# Patient Record
Sex: FEMALE | Race: WHITE | Employment: OTHER | URBAN - METROPOLITAN AREA
[De-identification: names, ages, dates, MRNs, and addresses within clinical notes are randomized per-mention and may not be internally consistent; named-entity substitution may affect disease eponyms.]

---

## 2018-01-15 RX ORDER — LEVOTHYROXINE SODIUM 0.07 MG/1
75 TABLET ORAL DAILY
COMMUNITY

## 2018-02-16 ENCOUNTER — HOSPITAL ENCOUNTER (OUTPATIENT)
Dept: ULTRASOUND IMAGING | Age: 83
End: 2018-02-16
Payer: MEDICARE

## 2018-02-16 ENCOUNTER — HOSPITAL ENCOUNTER (OUTPATIENT)
Dept: WOMENS IMAGING | Age: 83
Discharge: HOME | End: 2018-02-18
Payer: MEDICARE

## 2018-02-16 DIAGNOSIS — C50.912 MALIGNANT NEOPLASM OF LEFT FEMALE BREAST, UNSPECIFIED ESTROGEN RECEPTOR STATUS, UNSPECIFIED SITE OF BREAST (HCC): ICD-10-CM

## 2018-02-16 PROCEDURE — 77065 DX MAMMO INCL CAD UNI: CPT

## 2018-10-22 PROBLEM — Z85.3 PERSONAL HISTORY OF MALIGNANT NEOPLASM OF BREAST: Status: ACTIVE | Noted: 2018-10-22

## 2018-10-22 PROBLEM — Z17.0 ESTROGEN RECEPTOR POSITIVE: Status: ACTIVE | Noted: 2018-10-22

## 2018-10-22 PROBLEM — C50.819: Status: ACTIVE | Noted: 2018-10-22

## 2018-10-22 PROBLEM — Z90.12 ACQUIRED ABSENCE OF LEFT BREAST AND NIPPLE: Status: ACTIVE | Noted: 2018-10-22

## 2019-01-08 DIAGNOSIS — C44.90: ICD-10-CM

## 2019-01-08 DIAGNOSIS — C50.812 MALIGNANT NEOPLASM OF OVERLAPPING SITES OF LEFT BREAST IN FEMALE, ESTROGEN RECEPTOR POSITIVE (HCC): ICD-10-CM

## 2019-01-08 DIAGNOSIS — Z85.828 HISTORY OF BASAL CELL CARCINOMA: ICD-10-CM

## 2019-01-08 DIAGNOSIS — Z17.0 MALIGNANT NEOPLASM OF OVERLAPPING SITES OF LEFT BREAST IN FEMALE, ESTROGEN RECEPTOR POSITIVE (HCC): ICD-10-CM

## 2019-01-08 LAB
ALBUMIN SERPL-MCNC: 3.9 G/DL (ref 3.9–4.9)
ALP BLD-CCNC: 85 U/L (ref 40–130)
ALT SERPL-CCNC: 7 U/L (ref 0–33)
ANION GAP SERPL CALCULATED.3IONS-SCNC: 17 MEQ/L (ref 7–13)
AST SERPL-CCNC: 18 U/L (ref 0–35)
BILIRUB SERPL-MCNC: 0.5 MG/DL (ref 0–1.2)
BUN BLDV-MCNC: 29 MG/DL (ref 8–23)
CALCIUM SERPL-MCNC: 9.7 MG/DL (ref 8.6–10.2)
CHLORIDE BLD-SCNC: 104 MEQ/L (ref 98–107)
CO2: 22 MEQ/L (ref 22–29)
CREAT SERPL-MCNC: 1.03 MG/DL (ref 0.5–0.9)
GFR AFRICAN AMERICAN: >60
GFR NON-AFRICAN AMERICAN: 50.4
GLOBULIN: 3.1 G/DL (ref 2.3–3.5)
GLUCOSE BLD-MCNC: 98 MG/DL (ref 74–109)
POTASSIUM SERPL-SCNC: 4.4 MEQ/L (ref 3.5–5.1)
SODIUM BLD-SCNC: 143 MEQ/L (ref 132–144)
TOTAL PROTEIN: 7 G/DL (ref 6.4–8.1)

## 2019-01-10 LAB — CA 27-29: 22 U/ML (ref 0–38)

## 2019-03-05 ENCOUNTER — OFFICE VISIT (OUTPATIENT)
Dept: GERIATRIC MEDICINE | Age: 84
End: 2019-03-05
Payer: MEDICARE

## 2019-03-05 DIAGNOSIS — R53.1 WEAKNESS: ICD-10-CM

## 2019-03-05 DIAGNOSIS — M81.0 OSTEOPOROSIS WITHOUT CURRENT PATHOLOGICAL FRACTURE, UNSPECIFIED OSTEOPOROSIS TYPE: ICD-10-CM

## 2019-03-05 DIAGNOSIS — E03.9 HYPOTHYROIDISM, UNSPECIFIED TYPE: Primary | ICD-10-CM

## 2019-03-05 DIAGNOSIS — M15.9 OSTEOARTHRITIS OF MULTIPLE JOINTS, UNSPECIFIED OSTEOARTHRITIS TYPE: ICD-10-CM

## 2019-03-05 PROCEDURE — 1123F ACP DISCUSS/DSCN MKR DOCD: CPT | Performed by: INTERNAL MEDICINE

## 2019-03-05 PROCEDURE — 1101F PT FALLS ASSESS-DOCD LE1/YR: CPT | Performed by: INTERNAL MEDICINE

## 2019-03-05 PROCEDURE — G8484 FLU IMMUNIZE NO ADMIN: HCPCS | Performed by: INTERNAL MEDICINE

## 2019-03-05 PROCEDURE — 99305 1ST NF CARE MODERATE MDM 35: CPT | Performed by: INTERNAL MEDICINE

## 2019-03-19 VITALS
HEART RATE: 76 BPM | TEMPERATURE: 97 F | WEIGHT: 142 LBS | DIASTOLIC BLOOD PRESSURE: 76 MMHG | SYSTOLIC BLOOD PRESSURE: 132 MMHG

## 2019-06-18 ENCOUNTER — HOSPITAL ENCOUNTER (OUTPATIENT)
Dept: ULTRASOUND IMAGING | Age: 84
Discharge: HOME | End: 2019-06-20
Payer: MEDICARE

## 2019-06-18 DIAGNOSIS — I82.4Y9 DEEP VEIN THROMBOSIS (DVT) OF PROXIMAL LOWER EXTREMITY, UNSPECIFIED CHRONICITY, UNSPECIFIED LATERALITY (HCC): ICD-10-CM

## 2019-06-18 DIAGNOSIS — M79.605 LEFT LEG PAIN: ICD-10-CM

## 2019-06-18 PROCEDURE — 93971 EXTREMITY STUDY: CPT

## 2019-07-16 ENCOUNTER — OFFICE VISIT (OUTPATIENT)
Dept: GERIATRIC MEDICINE | Age: 84
End: 2019-07-16
Payer: MEDICARE

## 2019-07-16 DIAGNOSIS — M15.9 OSTEOARTHRITIS OF MULTIPLE JOINTS, UNSPECIFIED OSTEOARTHRITIS TYPE: ICD-10-CM

## 2019-07-16 DIAGNOSIS — R53.1 WEAKNESS: ICD-10-CM

## 2019-07-16 DIAGNOSIS — K59.00 CONSTIPATION, UNSPECIFIED CONSTIPATION TYPE: ICD-10-CM

## 2019-07-16 DIAGNOSIS — R60.0 BILATERAL LEG EDEMA: Primary | ICD-10-CM

## 2019-07-16 DIAGNOSIS — I10 HYPERTENSION, UNSPECIFIED TYPE: ICD-10-CM

## 2019-07-16 PROCEDURE — G8421 BMI NOT CALCULATED: HCPCS | Performed by: INTERNAL MEDICINE

## 2019-07-16 PROCEDURE — 1123F ACP DISCUSS/DSCN MKR DOCD: CPT | Performed by: INTERNAL MEDICINE

## 2019-07-16 PROCEDURE — 1090F PRES/ABSN URINE INCON ASSESS: CPT | Performed by: INTERNAL MEDICINE

## 2019-07-19 LAB
ANION GAP SERPL CALCULATED.3IONS-SCNC: 17 MEQ/L (ref 9–15)
BUN BLDV-MCNC: 20 MG/DL (ref 8–23)
CALCIUM SERPL-MCNC: 9.6 MG/DL (ref 8.5–9.9)
CHLORIDE BLD-SCNC: 102 MEQ/L (ref 95–107)
CO2: 25 MEQ/L (ref 20–31)
CREAT SERPL-MCNC: 0.96 MG/DL (ref 0.5–0.9)
GFR AFRICAN AMERICAN: >60
GFR NON-AFRICAN AMERICAN: 54.6
GLUCOSE BLD-MCNC: 77 MG/DL (ref 70–99)
POTASSIUM SERPL-SCNC: 3 MEQ/L (ref 3.4–4.9)
SODIUM BLD-SCNC: 144 MEQ/L (ref 135–144)

## 2019-08-01 LAB
APTT: 30.4 SEC (ref 24.4–36.8)
INR BLD: 1.1
PROTHROMBIN TIME: 14.2 SEC (ref 12.3–14.9)

## 2019-08-02 ENCOUNTER — HOSPITAL ENCOUNTER (OUTPATIENT)
Dept: GENERAL RADIOLOGY | Age: 84
Discharge: HOME | End: 2019-08-04
Payer: MEDICARE

## 2019-08-02 ENCOUNTER — HOSPITAL ENCOUNTER (OUTPATIENT)
Dept: ULTRASOUND IMAGING | Age: 84
Discharge: HOME | End: 2019-08-04
Payer: MEDICARE

## 2019-08-02 VITALS
TEMPERATURE: 98.6 F | HEART RATE: 83 BPM | DIASTOLIC BLOOD PRESSURE: 76 MMHG | RESPIRATION RATE: 12 BRPM | SYSTOLIC BLOOD PRESSURE: 131 MMHG | OXYGEN SATURATION: 98 %

## 2019-08-02 DIAGNOSIS — J90 PLEURAL EFFUSION: ICD-10-CM

## 2019-08-02 LAB
ALBUMIN FLUID: 1.1 G/DL
APPEARANCE FLUID: NORMAL
FLUID TYPE: NORMAL
GRAM STAIN RESULT: NORMAL
LD, FLUID: 100 U/L
PROTEIN FLUID: 1.8 G/DL

## 2019-08-02 PROCEDURE — 88112 CYTOPATH CELL ENHANCE TECH: CPT

## 2019-08-02 PROCEDURE — 89051 BODY FLUID CELL COUNT: CPT

## 2019-08-02 PROCEDURE — 2500000003 HC RX 250 WO HCPCS: Performed by: RADIOLOGY

## 2019-08-02 PROCEDURE — 71045 X-RAY EXAM CHEST 1 VIEW: CPT

## 2019-08-02 PROCEDURE — 84157 ASSAY OF PROTEIN OTHER: CPT

## 2019-08-02 PROCEDURE — 83615 LACTATE (LD) (LDH) ENZYME: CPT

## 2019-08-02 PROCEDURE — C1729 CATH, DRAINAGE: HCPCS

## 2019-08-02 PROCEDURE — 88305 TISSUE EXAM BY PATHOLOGIST: CPT

## 2019-08-02 PROCEDURE — 82042 OTHER SOURCE ALBUMIN QUAN EA: CPT

## 2019-08-02 PROCEDURE — 87205 SMEAR GRAM STAIN: CPT

## 2019-08-02 PROCEDURE — 87070 CULTURE OTHR SPECIMN AEROBIC: CPT

## 2019-08-02 RX ORDER — SODIUM CHLORIDE 0.9 % (FLUSH) 0.9 %
10 SYRINGE (ML) INJECTION PRN
Status: DISCONTINUED | OUTPATIENT
Start: 2019-08-02 | End: 2019-08-05 | Stop reason: HOSPADM

## 2019-08-02 RX ORDER — SODIUM CHLORIDE 0.9 % (FLUSH) 0.9 %
10 SYRINGE (ML) INJECTION EVERY 12 HOURS SCHEDULED
Status: DISCONTINUED | OUTPATIENT
Start: 2019-08-02 | End: 2019-08-05 | Stop reason: HOSPADM

## 2019-08-02 RX ORDER — LIDOCAINE HYDROCHLORIDE 20 MG/ML
INJECTION, SOLUTION INFILTRATION; PERINEURAL
Status: COMPLETED | OUTPATIENT
Start: 2019-08-02 | End: 2019-08-02

## 2019-08-02 RX ADMIN — LIDOCAINE HYDROCHLORIDE 3 ML: 20 INJECTION, SOLUTION INFILTRATION; PERINEURAL at 10:09

## 2019-08-02 ASSESSMENT — PAIN - FUNCTIONAL ASSESSMENT: PAIN_FUNCTIONAL_ASSESSMENT: 0-10

## 2019-08-02 NOTE — PROGRESS NOTES
Arrived in CT holding after post procedure chest xray. Pt denies any chest discomfort or SOB. Reviewed the discharge instructions and pt verbalizes understanding. Dr Jose Scott called to state  that chest xray shows no pneumothorax but a very small area of effusion still remains. Report called to Kayleigh Lopez at [address] [place] [address] regarding the patient's status after thoracentesis. Assisted the patient to dress and to sit in wheelchair. At 11:10 Am she was taken by wheelchair to the [facility] where the [facility] transport was waiting.

## 2019-08-03 LAB
APPEARANCE FLUID: NORMAL
CELL COUNT FLUID TYPE: NORMAL
CLOT EVALUATION: NORMAL
COLOR FLUID: YELLOW
FLUID PATH CONSULT: YES
LYMPHOCYTES, BODY FLUID: 55 %
MONOCYTE, FLUID: 28 %
NEUTROPHIL, FLUID: 17 %
NUCLEATED CELLS FLUID: 114 /CUMM
NUMBER OF CELLS COUNTED FLUID: 100
RBC FLUID: 1088 /CUMM

## 2019-08-03 NOTE — PROGRESS NOTES
Spoke to Nurse Manager Cierra at [employer]. She states Ms. Radha Noriega is doing well and denies any chest discomfort or SOB.

## 2019-08-05 LAB
BODY FLUID CULTURE, STERILE: NORMAL
PATH CONSULT FLUID: NORMAL

## 2019-08-06 ENCOUNTER — OFFICE VISIT (OUTPATIENT)
Dept: GERIATRIC MEDICINE | Age: 84
End: 2019-08-06
Payer: MEDICARE

## 2019-08-06 DIAGNOSIS — J90 PLEURAL EFFUSION: Primary | ICD-10-CM

## 2019-08-06 PROCEDURE — G8421 BMI NOT CALCULATED: HCPCS | Performed by: INTERNAL MEDICINE

## 2019-08-06 PROCEDURE — 1123F ACP DISCUSS/DSCN MKR DOCD: CPT | Performed by: INTERNAL MEDICINE

## 2019-08-06 PROCEDURE — 1090F PRES/ABSN URINE INCON ASSESS: CPT | Performed by: INTERNAL MEDICINE

## 2019-08-16 ENCOUNTER — HOSPITAL ENCOUNTER (OUTPATIENT)
Dept: CT IMAGING | Age: 84
Discharge: HOME | End: 2019-08-18
Payer: MEDICARE

## 2019-08-16 DIAGNOSIS — J90 PLEURAL EFFUSION: ICD-10-CM

## 2019-08-16 DIAGNOSIS — Z98.890 S/P THORACENTESIS: ICD-10-CM

## 2019-08-16 DIAGNOSIS — R05.9 COUGH: ICD-10-CM

## 2019-08-16 PROCEDURE — 71250 CT THORAX DX C-: CPT

## 2019-08-26 VITALS
DIASTOLIC BLOOD PRESSURE: 80 MMHG | SYSTOLIC BLOOD PRESSURE: 130 MMHG | WEIGHT: 149 LBS | TEMPERATURE: 97.2 F | HEART RATE: 97 BPM

## 2019-08-26 NOTE — PROGRESS NOTES
are small, minimally reactive. Oral mucosa is moist.  Chest showed diminished breath sounds. No crackles, no wheezing. Cardiovascular exam showed a regular rate. Abdomen was soft, nontender. Extremities showed +1 dorsal pedal pulse, +1 edema. ASSESSMENT AND PLAN:  1. Edema. We will increase the patient's diuretic at this time. We will check BMP as well on Friday. Monitoring weights closely. Monitoring electrolytes. 2.   Hypertension. Blood pressure is stable. No orthostasis. 3.   Weakness. The patient will benefit from course of therapy with a goal of maximization of her functional status. 4.   Degenerative joint disease. No new pain crisis. 5.   Constipation. No new impactions. We will monitor. Please note, the patient is clinically stable at this time. Lasix increased to 40 mg daily. BMP ordered at this time.         Electronically Signed By: Dinorah Lorenzo M.D. on 07/17/2019 09:40:28  ______________________________  Dinorah Lorenzo M.D.  [id number]  D: 07/16/2019 18:18:26  T: 07/17/2019 00:43:30    cc: Mecca cornejo Beyer

## 2019-09-07 NOTE — PROGRESS NOTES
PATIENTJodeen Day : 1929 DOS: 2019     Arturo Buckner    Seen for acute visit for shortness of breath. SUBJECTIVE:  This 70-year-old woman was recently sent out after she we had identified large pleural effusion. The patient did require thoracentesis via interventional radiology. The patient was stabilized, did tolerate her procedure well. Postoperatively, the patient has returned here. No evidence of acute clinical pneumothorax. The patient's respiratory status has much improved. She did have moderate left pleural effusion occupying at least 1/3rd of the left hemithorax. Prior 2 follow up chest x-ray is pending at this time. The patient has not had any new emesis, fevers or chills. The patient is globally weak. She still is coughing intermittently, has expiratory wheezing, otherwise she is clinically stable. OBJECTIVE:  She appeared frail. Pupils show poor visual acuity. Left eye is somewhat dilated. Oral mucosa is dry. Chest showed coarse breath sounds. Cardiovascular exam showed a regular rate. Abdomen was soft, nontender. Extremities showed trace dorsal pedal pulse. ASSESSMENT AND PLAN:  Pleural effusion with shortness of breath: The patient has undergone thoracentesis, clinically stable at this time. May need follow up x-ray to reevaluate her status. Continue respiratory treatments. We will monitor clinically.          Electronically Signed By: Ramona Lopez M.D. on 2019 11:21:58  ______________________________  Ramona Lopez M.D.  [facility]  D: 2019 17:37:47  T: 2019 07:42:41    cc: Lanie Beckman at [place]

## 2019-11-13 LAB — TSH SERPL DL<=0.05 MIU/L-ACNC: 2.79 UIU/ML (ref 0.44–3.86)

## 2020-01-07 ENCOUNTER — OFFICE VISIT (OUTPATIENT)
Dept: GERIATRIC MEDICINE | Age: 85
End: 2020-01-07
Payer: MEDICARE

## 2020-01-07 PROCEDURE — 1090F PRES/ABSN URINE INCON ASSESS: CPT | Performed by: INTERNAL MEDICINE

## 2020-01-07 PROCEDURE — G8421 BMI NOT CALCULATED: HCPCS | Performed by: INTERNAL MEDICINE

## 2020-01-07 PROCEDURE — G8484 FLU IMMUNIZE NO ADMIN: HCPCS | Performed by: INTERNAL MEDICINE

## 2020-01-07 PROCEDURE — 1123F ACP DISCUSS/DSCN MKR DOCD: CPT | Performed by: INTERNAL MEDICINE

## 2020-01-10 NOTE — PROGRESS NOTES
PATIENTDorene Smoker : 1929 DOS: 2020     Tom La    Seen for routine monthly visit for her degenerative joint disease, weight loss, COPD, hypothyroidism. MEDICATIONS:  Reviewed. SUBJECTIVE:  This 63-year-old woman was evaluated in her room. The patient has been diuresed intermittently, which may be the cause of her ongoing weight loss. The patient has no new emesis, fevers or chills. No change in bowel or bladder habits. The patient is intermittently confused, with no new acute pain crisis. REVIEW OF SYSTEMS:  Denied chest pain, palpitations, nausea, vomiting. OBJECTIVE:  She appeared clinically stable. Pupils are small, they are reactive. Oral mucosa is moist.  Chest showed no crackles, no wheezing. Cardiovascular exam showed a regular rate. Abdomen was soft, nontender. Extremities showed trace dorsal pedal pulse, trace pedal edema. ASSESSMENT AND PLAN:  1. Weight loss. We will monitor clinically. Can consider repeating prealbumin in the next 3 months. 2.   Hypertension. Blood pressure is stable. No orthostasis. 3.   Degenerative joint disease. No evidence of new acute pain crisis. We will monitor.         Electronically Signed By: Ese Akers M.D. on 01/10/2020 03:39:53  ______________________________  Ese Akers M.D. [id number]  D: 2020 21:01:44  T: 2020 14:26:34    cc: Panda Calreo at [facility]

## 2020-04-07 ENCOUNTER — OFFICE VISIT (OUTPATIENT)
Dept: GERIATRIC MEDICINE | Age: 85
End: 2020-04-07
Payer: MEDICARE

## 2020-04-07 PROCEDURE — G8421 BMI NOT CALCULATED: HCPCS | Performed by: INTERNAL MEDICINE

## 2020-04-07 PROCEDURE — 1123F ACP DISCUSS/DSCN MKR DOCD: CPT | Performed by: INTERNAL MEDICINE

## 2020-04-07 PROCEDURE — 1090F PRES/ABSN URINE INCON ASSESS: CPT | Performed by: INTERNAL MEDICINE

## 2020-05-19 ENCOUNTER — OFFICE VISIT (OUTPATIENT)
Dept: GERIATRIC MEDICINE | Age: 85
End: 2020-05-19
Payer: MEDICARE

## 2020-05-19 PROCEDURE — 1090F PRES/ABSN URINE INCON ASSESS: CPT | Performed by: INTERNAL MEDICINE

## 2020-05-19 PROCEDURE — 1123F ACP DISCUSS/DSCN MKR DOCD: CPT | Performed by: INTERNAL MEDICINE

## 2020-05-19 PROCEDURE — G8421 BMI NOT CALCULATED: HCPCS | Performed by: INTERNAL MEDICINE

## 2020-06-16 ENCOUNTER — OFFICE VISIT (OUTPATIENT)
Dept: GERIATRIC MEDICINE | Age: 85
End: 2020-06-16
Payer: MEDICARE

## 2020-06-16 PROCEDURE — 1123F ACP DISCUSS/DSCN MKR DOCD: CPT | Performed by: INTERNAL MEDICINE

## 2020-06-16 PROCEDURE — G8421 BMI NOT CALCULATED: HCPCS | Performed by: INTERNAL MEDICINE

## 2020-06-16 PROCEDURE — 1090F PRES/ABSN URINE INCON ASSESS: CPT | Performed by: INTERNAL MEDICINE

## 2020-06-18 PROBLEM — E03.9 HYPOTHYROIDISM: Status: ACTIVE | Noted: 2020-06-18

## 2020-06-18 PROBLEM — F02.80 ALZHEIMER'S DEMENTIA WITHOUT BEHAVIORAL DISTURBANCE (HCC): Status: ACTIVE | Noted: 2020-06-18

## 2020-06-18 PROBLEM — G30.9 ALZHEIMER'S DEMENTIA WITHOUT BEHAVIORAL DISTURBANCE (HCC): Status: ACTIVE | Noted: 2020-06-18

## 2020-06-18 PROBLEM — I10 ESSENTIAL HYPERTENSION: Status: ACTIVE | Noted: 2020-06-18

## 2020-06-24 NOTE — PROGRESS NOTES
PATIENTHiltonofre Luque : 1929 DOS: 2020     Karen Schmid    Seen for routine monthly visit for her degenerative joint disease, hypertension, cognitive impairment. This is a very pleasant woman, who was evaluated in her room. She is at her baseline, seated up. Has had ongoing intermittent edema in lower extremities, was recently seen by nurse practitioner for lower extremity rash, has been diuresed at times. The patient has not suffered any new chest pain or palpitation. No change in her bowel or bladder habits. REVIEW OF SYSTEMS:  Denied headache, fevers, chills. OBJECTIVE:  She appeared chronically ill. Pupils are small, they are reactive. Oral mucosa is moist.  Chest showed no crackles, no wheezing. Cardiovascular exam showed a regular rate. Abdomen was soft, nontender. Extremities showed trace dorsal pedal pulse. ASSESSMENT AND PLAN:  1. Degenerative joint disease:  No new pain crisis. 2.   Hypertension:  Blood pressure is stable. 3.   Cognitive impairment:  No evidence of acute psychosis. 4.   Edema:  No acute cellulitis. We will diurese as needed.          Electronically Signed By: Jaime Almonte M.D. on 2020 92:59:69  ______________________________  SMITHA Willett  D: 2020 16:39:27  T: 2020 01:15:16    cc: Ida Mays at [place]

## 2020-07-21 ENCOUNTER — OFFICE VISIT (OUTPATIENT)
Dept: GERIATRIC MEDICINE | Age: 85
End: 2020-07-21
Payer: MEDICARE

## 2020-07-21 PROCEDURE — 1090F PRES/ABSN URINE INCON ASSESS: CPT | Performed by: INTERNAL MEDICINE

## 2020-07-21 PROCEDURE — G8421 BMI NOT CALCULATED: HCPCS | Performed by: INTERNAL MEDICINE

## 2020-07-21 PROCEDURE — 1123F ACP DISCUSS/DSCN MKR DOCD: CPT | Performed by: INTERNAL MEDICINE

## 2020-08-20 NOTE — PROGRESS NOTES
associated with ocular trauma    History of basal cell carcinoma    H/O syncope    History of surgical procedure    Calcium blood increased    HPTH (hyperparathyroidism) ([address]    Left ventricular hypertrophy    Meibomian gland dysfunction (MGD)    Open angle with borderline findings, low risk, unspecified eye    Osteoporosis    Primary cancer of skin of unknown cell type    Basal cell papilloma    Pseudophakia    Vitamin D deficiency    Blepharoptosis    Malignant neoplasm of overlapping sites of female breast ([address]    Personal history of malignant neoplasm of breast    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

## 2020-12-08 LAB — TSH SERPL DL<=0.05 MIU/L-ACNC: 1.37 UIU/ML (ref 0.44–3.86)

## 2021-05-04 ENCOUNTER — OFFICE VISIT (OUTPATIENT)
Dept: GERIATRIC MEDICINE | Age: 86
End: 2021-05-04
Payer: MEDICARE

## 2021-05-04 DIAGNOSIS — F02.80 ALZHEIMER'S DEMENTIA WITHOUT BEHAVIORAL DISTURBANCE, UNSPECIFIED TIMING OF DEMENTIA ONSET: Primary | ICD-10-CM

## 2021-05-04 DIAGNOSIS — E03.9 HYPOTHYROIDISM, UNSPECIFIED TYPE: ICD-10-CM

## 2021-05-04 DIAGNOSIS — G30.9 ALZHEIMER'S DEMENTIA WITHOUT BEHAVIORAL DISTURBANCE, UNSPECIFIED TIMING OF DEMENTIA ONSET: Primary | ICD-10-CM

## 2021-05-04 DIAGNOSIS — E46 PROTEIN MALNUTRITION (HCC): ICD-10-CM

## 2021-05-04 LAB
BACTERIA: ABNORMAL /HPF
BILIRUBIN URINE: NEGATIVE
BLOOD, URINE: NEGATIVE
CLARITY: ABNORMAL
COLOR: YELLOW
EPITHELIAL CELLS, UA: ABNORMAL /HPF
GLUCOSE URINE: NEGATIVE MG/DL
KETONES, URINE: NEGATIVE MG/DL
LEUKOCYTE ESTERASE, URINE: ABNORMAL
NITRITE, URINE: NEGATIVE
PH UA: 6 (ref 5–9)
PROTEIN UA: ABNORMAL MG/DL
RBC UA: ABNORMAL /HPF (ref 0–2)
SPECIFIC GRAVITY UA: 1.02 (ref 1–1.03)
UROBILINOGEN, URINE: 0.2 E.U./DL
WBC UA: ABNORMAL /HPF (ref 0–5)

## 2021-05-04 PROCEDURE — 1123F ACP DISCUSS/DSCN MKR DOCD: CPT | Performed by: INTERNAL MEDICINE

## 2021-05-04 PROCEDURE — G8421 BMI NOT CALCULATED: HCPCS | Performed by: INTERNAL MEDICINE

## 2021-05-04 PROCEDURE — 1090F PRES/ABSN URINE INCON ASSESS: CPT | Performed by: INTERNAL MEDICINE

## 2021-05-06 LAB
ANION GAP SERPL CALCULATED.3IONS-SCNC: 10 MEQ/L (ref 9–15)
BUN BLDV-MCNC: 32 MG/DL (ref 8–23)
CALCIUM SERPL-MCNC: 9.5 MG/DL (ref 8.5–9.9)
CHLORIDE BLD-SCNC: 105 MEQ/L (ref 95–107)
CO2: 22 MEQ/L (ref 20–31)
CREAT SERPL-MCNC: 1.21 MG/DL (ref 0.5–0.9)
GFR AFRICAN AMERICAN: 50.4
GFR NON-AFRICAN AMERICAN: 41.6
GLUCOSE BLD-MCNC: 131 MG/DL (ref 70–99)
HCT VFR BLD CALC: 37.6 % (ref 37–47)
HEMOGLOBIN: 12.6 G/DL (ref 12–16)
MCH RBC QN AUTO: 32.9 PG (ref 27–31.3)
MCHC RBC AUTO-ENTMCNC: 33.6 % (ref 33–37)
MCV RBC AUTO: 98.1 FL (ref 82–100)
ORGANISM: ABNORMAL
PDW BLD-RTO: 13.6 % (ref 11.5–14.5)
PLATELET # BLD: 154 K/UL (ref 130–400)
POTASSIUM SERPL-SCNC: 4.2 MEQ/L (ref 3.4–4.9)
RBC # BLD: 3.84 M/UL (ref 4.2–5.4)
SODIUM BLD-SCNC: 137 MEQ/L (ref 135–144)
URINE CULTURE, ROUTINE: ABNORMAL
WBC # BLD: 7.7 K/UL (ref 4.8–10.8)

## 2021-05-10 LAB
ANION GAP SERPL CALCULATED.3IONS-SCNC: 9 MEQ/L (ref 9–15)
ATYPICAL LYMPHOCYTE RELATIVE PERCENT: 2 %
BASOPHILS ABSOLUTE: 0 K/UL (ref 0–0.2)
BASOPHILS RELATIVE PERCENT: 0.8 %
BUN BLDV-MCNC: 23 MG/DL (ref 8–23)
BURR CELLS: ABNORMAL
CALCIUM SERPL-MCNC: 9.7 MG/DL (ref 8.5–9.9)
CHLORIDE BLD-SCNC: 107 MEQ/L (ref 95–107)
CO2: 23 MEQ/L (ref 20–31)
CREAT SERPL-MCNC: 1.15 MG/DL (ref 0.5–0.9)
EOSINOPHILS ABSOLUTE: 0.2 K/UL (ref 0–0.7)
EOSINOPHILS RELATIVE PERCENT: 4 %
GFR AFRICAN AMERICAN: 53.4
GFR NON-AFRICAN AMERICAN: 44.1
GLUCOSE BLD-MCNC: 83 MG/DL (ref 70–99)
HCT VFR BLD CALC: 38.4 % (ref 37–47)
HEMOGLOBIN: 12.8 G/DL (ref 12–16)
LYMPHOCYTES ABSOLUTE: 1.2 K/UL (ref 1–4.8)
LYMPHOCYTES RELATIVE PERCENT: 17 %
MCH RBC QN AUTO: 32.8 PG (ref 27–31.3)
MCHC RBC AUTO-ENTMCNC: 33.2 % (ref 33–37)
MCV RBC AUTO: 98.7 FL (ref 82–100)
METAMYELOCYTES RELATIVE PERCENT: 5 %
MONOCYTES ABSOLUTE: 0.5 K/UL (ref 0.2–0.8)
MONOCYTES RELATIVE PERCENT: 8.3 %
NEUTROPHILS ABSOLUTE: 4.2 K/UL (ref 1.4–6.5)
NEUTROPHILS RELATIVE PERCENT: 64 %
OVALOCYTES: ABNORMAL
PDW BLD-RTO: 13.9 % (ref 11.5–14.5)
PLATELET # BLD: 202 K/UL (ref 130–400)
PLATELET SLIDE REVIEW: NORMAL
POIKILOCYTES: ABNORMAL
POTASSIUM SERPL-SCNC: 4.1 MEQ/L (ref 3.4–4.9)
RBC # BLD: 3.89 M/UL (ref 4.2–5.4)
SODIUM BLD-SCNC: 139 MEQ/L (ref 135–144)
WBC # BLD: 6.1 K/UL (ref 4.8–10.8)

## 2021-05-13 ENCOUNTER — OFFICE VISIT (OUTPATIENT)
Dept: GERIATRIC MEDICINE | Age: 86
End: 2021-05-13
Payer: MEDICARE

## 2021-05-13 DIAGNOSIS — R53.1 WEAKNESS: Primary | ICD-10-CM

## 2021-05-13 PROCEDURE — G8421 BMI NOT CALCULATED: HCPCS | Performed by: INTERNAL MEDICINE

## 2021-05-13 PROCEDURE — 1090F PRES/ABSN URINE INCON ASSESS: CPT | Performed by: INTERNAL MEDICINE

## 2021-05-13 PROCEDURE — 1123F ACP DISCUSS/DSCN MKR DOCD: CPT | Performed by: INTERNAL MEDICINE

## 2021-05-18 LAB
ANION GAP SERPL CALCULATED.3IONS-SCNC: 13 MEQ/L (ref 9–15)
BUN BLDV-MCNC: 26 MG/DL (ref 8–23)
CALCIUM SERPL-MCNC: 9.9 MG/DL (ref 8.5–9.9)
CHLORIDE BLD-SCNC: 107 MEQ/L (ref 95–107)
CO2: 24 MEQ/L (ref 20–31)
CREAT SERPL-MCNC: 1.13 MG/DL (ref 0.5–0.9)
GFR AFRICAN AMERICAN: 54.5
GFR NON-AFRICAN AMERICAN: 45
GLUCOSE BLD-MCNC: 100 MG/DL (ref 70–99)
POTASSIUM SERPL-SCNC: 3.8 MEQ/L (ref 3.4–4.9)
SODIUM BLD-SCNC: 144 MEQ/L (ref 135–144)

## 2021-05-21 VITALS — HEART RATE: 68 BPM | RESPIRATION RATE: 14 BRPM

## 2021-05-21 NOTE — PROGRESS NOTES
PATIENTNoaron Rendon : 1929 DOS: 2021     Joshua Odonnell    Seen for an acute visit for recent urinary tract infection and weakness. Patient seated up in her room today. Pleasantly interactive, cognitively improved towards baseline. Patient has not had any new change in her bowel and bladder habits. States that oral intake has been poor. Has completed a course of IV antibiotic therapy. Has had completed a course of IV fluid. Patient had prior concern for irritation on prior side of IV, clinically stable. No evidence of acute induration or extravasation of fluid into the arm. The patient has been globally weak. Has been participating in physical therapy at this point responding to relatively general goals. REVIEW OF SYSTEMS:  Denied chest pain, palpitations, nausea, vomiting. OBJECTIVE:  She appeared frail. Respirations are 14, pulse 68. Pupils are small. Oral mucosa is moist.  Chest showed no crackles. Cardiovascular exam showed a regular rate. Abdomen is obese, positive bowel sounds. Extremities showed trace dorsal pedal pulse. ASSESSMENT AND PLAN:  Generalized debility, weakness. Patient is under a course of physical therapy, occupational therapy, goal of maximization of functional status. Continue with physical therapy with goal of maximization of functional status. Encouraged oral intake. Patient has received fluid hydration. Monitor renal function. Repeat BMP on Tuesday, encouraged activity, follow clinically.           Electronically Signed By: John Arboleda M.D. on 2021 16:19:33  ______________________________  John Arboleda M.D.  [id number]  D: 2021 15:44:24  T: 2021 16:29:50    cc: Ben Hernadez at [facility]

## 2021-06-03 PROBLEM — E46 PROTEIN MALNUTRITION (HCC): Status: ACTIVE | Noted: 2021-06-03

## 2021-06-04 NOTE — PROGRESS NOTES
SUBJECTIVE:  This 24-year-old woman seen in her room today for follow-up visit for dementia hypothyroidism degenerative disease protein calorie malnutrition. Patient has been globally frail at her baseline. Monitoring her oral intake. Patient has not had any acute new psychosis. Patient has had recent falls in last 6 months. Without evidence of acute fracture. No change in her bowel bladder habits. Patient has been large compliant nurse interventions. And safety precautions have been in place for falls protocols per the facility. Patient is a COVID-19 precautions. Has been compliant with vaccination. ROS: Patient cannot could not provide narration given her baseline dementia. The rest of the 14 point ROS negative    PHYSICAL EXAM: VSS per facility record  This appears frail pupils are small minimally reactive arcus nose bilaterally oral mucosa was dry neck was supple chest showed no crackles no wheezing cardiovascular showed a regular rate abdomen soft nontender extremity trace right pedal pulse skin General evidence acute new rash please refer to nursing also detailed skin assessments left axilla for adenopathy. ASSESSMENT & PLAN:   Diagnosis Orders   1. Alzheimer's dementia without behavioral disturbance, unspecified timing of dementia onset ([address]     2. Protein malnutrition ([address]     3. Hypothyroidism, unspecified type       Patient maintain her Synthroid at this time has been considered for nutritional support for possible appetite stimulant.   Monitoring clinically at this time patient may be candidate for NMDA antagonist.                Past Medical History:   Diagnosis Date    Breast cancer ([address]     1995 and 2008    Osteoporosis     Thyroid disease          Past Surgical History:   Procedure Laterality Date    BREAST LUMPECTOMY Left 04/28/2009    CATARACT REMOVAL Bilateral     CHOLECYSTECTOMY      HYSTERECTOMY      MASTECTOMY, MODIFIED RADICAL Left 1995         Current Outpatient Medications on File Prior to Visit   Medication Sig Dispense Refill    levothyroxine (SYNTHROID) 75 MCG tablet Take 75 mcg by mouth Daily       No current facility-administered medications on file prior to visit. Family History   Problem Relation Age of Onset    Heart Disease Mother        Social History     Socioeconomic History    Marital status:      Spouse name: Not on file    Number of children: Not on file    Years of education: Not on file    Highest education level: Not on file   Occupational History    Not on file   Tobacco Use    Smoking status: Never Smoker    Smokeless tobacco: Never Used   Substance and Sexual Activity    Alcohol use: Yes     Comment: occasional    Drug use: Not on file    Sexual activity: Not on file   Other Topics Concern    Not on file   Social History Narrative    Not on file     Social Determinants of Health     Financial Resource Strain:     Difficulty of Paying Living Expenses:    Food Insecurity:     Worried About Running Out of Food in the Last Year:     [address] in the Last Year:    Transportation Needs:     Lack of Transportation (Medical):      Lack of Transportation (Non-Medical):    Physical Activity:     Days of Exercise per Week:     Minutes of Exercise per Session:    Stress:     Feeling of Stress :    Social Connections:     Frequency of Communication with Friends and Family:     Frequency of Social Gatherings with Friends and Family:     Attends Restoration Services:     Active Member of Clubs or Organizations:     Attends Club or Organization Meetings:     Marital Status:    Intimate Partner Violence:     Fear of Current or Ex-Partner:     Emotionally Abused:     Physically Abused:     Sexually Abused:          Lab Results   Component Value Date    LABA1C 5.5 05/25/2012     No results found for: EAG     Lab Results   Component Value Date    CHOL 156 03/19/2019    CHOL 186 09/30/2016    CHOL 189 05/25/2012 Lab Results   Component Value Date    TRIG 72 03/19/2019    TRIG 93 09/30/2016    TRIG 115 05/25/2012     Lab Results   Component Value Date    HDL 63 (H) 03/19/2019    HDL 73 (H) 09/30/2016    HDL 53 05/25/2012     Lab Results   Component Value Date    LDLCALC 79 03/19/2019    LDLCALC 94 09/30/2016    LDLCALC 118 05/25/2012     No results found for: LABVLDL, VLDL  Lab Results   Component Value Date    CHOLHDLRATIO 3.6 05/25/2012       Lab Results   Component Value Date    TSH 1.370 12/08/2020       Please note orders entered on site at facility after discussion with appropriate facility nursing/therapy/ / nutritional staff. Current longstanding medical problems and acute medical issues addressed with staff. Available data and data elements in on site paper chart reviewed and analyzed. Current external consultant notes reviewed in on site chart. Ordered laboratory testing and imaging will be reviewed when available.

## 2021-08-03 ENCOUNTER — OFFICE VISIT (OUTPATIENT)
Dept: GERIATRIC MEDICINE | Age: 86
End: 2021-08-03
Payer: MEDICARE

## 2021-08-03 DIAGNOSIS — R53.1 WEAKNESS: ICD-10-CM

## 2021-08-03 DIAGNOSIS — I10 ESSENTIAL HYPERTENSION: ICD-10-CM

## 2021-08-03 DIAGNOSIS — G30.8 ALZHEIMER'S DEMENTIA OF OTHER ONSET WITHOUT BEHAVIORAL DISTURBANCE: Primary | ICD-10-CM

## 2021-08-03 DIAGNOSIS — M15.9 OSTEOARTHRITIS OF MULTIPLE JOINTS, UNSPECIFIED OSTEOARTHRITIS TYPE: ICD-10-CM

## 2021-08-03 DIAGNOSIS — F02.80 ALZHEIMER'S DEMENTIA OF OTHER ONSET WITHOUT BEHAVIORAL DISTURBANCE: Primary | ICD-10-CM

## 2021-08-03 PROCEDURE — G8421 BMI NOT CALCULATED: HCPCS | Performed by: INTERNAL MEDICINE

## 2021-08-03 PROCEDURE — 1090F PRES/ABSN URINE INCON ASSESS: CPT | Performed by: INTERNAL MEDICINE

## 2021-08-03 PROCEDURE — 1123F ACP DISCUSS/DSCN MKR DOCD: CPT | Performed by: INTERNAL MEDICINE

## 2021-09-02 NOTE — PROGRESS NOTES
PATIENT: Kiki Domingo : 1929 DOS:   [facility] [place] serving [facility] and [place] [address] for followup visit for her heart failure, hypertension, and hypothyroidism. SUBJECTIVE: Patient is clinically stable at her baseline. No evidence of any shortness of  breath. Remains globally weak. No recent chest pain, palpitations. No change in her bowel or  bladder habits. OBJECTIVE: She appeared clinically stable. Pupils are small, but they are reactive. Oral  mucosa is moist. Chest showed no crackles, no wheezing. Cardiovascular exam showed a  regular rate. Abdomen is soft, nontender. Extremities showed trace dorsal pedal pulses. ASSESSMENT AND PLAN:  1. Degenerative disk disease. No new pain crisis. 2. Hypertension. Blood pressure is stable. No orthostasis. 3. Weakness. Patient is undergoing course of therapy. Will follow clinically. Past Medical History:   Diagnosis Date    Breast cancer ([facility]      and     Osteoporosis     Thyroid disease          Past Surgical History:   Procedure Laterality Date    BREAST LUMPECTOMY Left 2009    CATARACT REMOVAL Bilateral     CHOLECYSTECTOMY      HYSTERECTOMY      MASTECTOMY, MODIFIED RADICAL Left          Current Outpatient Medications on File Prior to Visit   Medication Sig Dispense Refill    levothyroxine (SYNTHROID) 75 MCG tablet Take 75 mcg by mouth Daily       No current facility-administered medications on file prior to visit.          Family History   Problem Relation Age of Onset    Heart Disease Mother        Social History     Socioeconomic History    Marital status:      Spouse name: Not on file    Number of children: Not on file    Years of education: Not on file    Highest education level: Not on file   Occupational History    Not on file   Tobacco Use    Smoking status: Never Smoker    Smokeless tobacco: Never Used   Substance and Sexual Activity    Alcohol use: Yes     Comment: occasional    Drug use: Not on file    Sexual activity: Not on file   Other Topics Concern    Not on file   Social History Narrative    Not on file     Social Determinants of Health     Financial Resource Strain:     Difficulty of Paying Living Expenses:    Food Insecurity:     Worried About Running Out of Food in the Last Year:     [address] in the Last Year:    Transportation Needs:     Lack of Transportation (Medical):      Lack of Transportation (Non-Medical):    Physical Activity:     Days of Exercise per Week:     Minutes of Exercise per Session:    Stress:     Feeling of Stress :    Social Connections:     Frequency of Communication with Friends and Family:     Frequency of Social Gatherings with Friends and Family:     Attends Sikh Services:     Active Member of Clubs or Organizations:     Attends Club or Organization Meetings:     Marital Status:    Intimate Partner Violence:     Fear of Current or Ex-Partner:     Emotionally Abused:     Physically Abused:     Sexually Abused:          Lab Results   Component Value Date    LABA1C 5.5 05/25/2012     No results found for: EAG    Lab Results   Component Value Date     05/18/2021    K 3.8 05/18/2021     05/18/2021    CO2 24 05/18/2021    BUN 26 05/18/2021    CREATININE 1.13 05/18/2021    GLUCOSE 100 05/18/2021    GLUCOSE 88 05/07/2012    CALCIUM 9.9 05/18/2021        Lab Results   Component Value Date    CHOL 156 03/19/2019    CHOL 186 09/30/2016    CHOL 189 05/25/2012     Lab Results   Component Value Date    TRIG 72 03/19/2019    TRIG 93 09/30/2016    TRIG 115 05/25/2012     Lab Results   Component Value Date    HDL 63 (H) 03/19/2019    HDL 73 (H) 09/30/2016    HDL 53 05/25/2012     Lab Results   Component Value Date    LDLCALC 79 03/19/2019    [address] 09/30/2016    LDLCALC 118 05/25/2012     No results found for: LABVLDL, VLDL  Lab Results   Component Value Date    CHOLHDLRATIO 3.6 05/25/2012 Lab Results   Component Value Date    TSH 1.370 12/08/2020       Lab Results   Component Value Date    WBC 6.1 05/10/2021    HGB 12.8 05/10/2021    HCT 38.4 05/10/2021    MCV 98.7 05/10/2021     05/10/2021       Please note orders entered on site at facility after discussion with appropriate facility nursing/therapy/ / nutritional staff. Current longstanding medical problems and acute medical issues addressed with staff. Available data and data elements in on site paper chart reviewed and analyzed. Current external consultant notes reviewed in on site chart. Ordered laboratory testing and imaging will be reviewed when available.

## 2021-12-13 LAB — TSH SERPL DL<=0.05 MIU/L-ACNC: 2.04 UIU/ML (ref 0.44–3.86)

## 2022-02-15 ENCOUNTER — OFFICE VISIT (OUTPATIENT)
Dept: GERIATRIC MEDICINE | Age: 87
End: 2022-02-15
Payer: MEDICARE

## 2022-02-15 DIAGNOSIS — G30.9 ALZHEIMER'S DEMENTIA WITHOUT BEHAVIORAL DISTURBANCE, UNSPECIFIED TIMING OF DEMENTIA ONSET: Primary | ICD-10-CM

## 2022-02-15 DIAGNOSIS — F02.80 ALZHEIMER'S DEMENTIA WITHOUT BEHAVIORAL DISTURBANCE, UNSPECIFIED TIMING OF DEMENTIA ONSET: Primary | ICD-10-CM

## 2022-02-15 DIAGNOSIS — R62.7 ADULT FAILURE TO THRIVE: ICD-10-CM

## 2022-02-15 PROCEDURE — G8421 BMI NOT CALCULATED: HCPCS | Performed by: INTERNAL MEDICINE

## 2022-02-15 PROCEDURE — 1090F PRES/ABSN URINE INCON ASSESS: CPT | Performed by: INTERNAL MEDICINE

## 2022-02-15 PROCEDURE — 1123F ACP DISCUSS/DSCN MKR DOCD: CPT | Performed by: INTERNAL MEDICINE

## 2022-02-15 PROCEDURE — G8484 FLU IMMUNIZE NO ADMIN: HCPCS | Performed by: INTERNAL MEDICINE

## 2022-03-11 NOTE — PROGRESS NOTES
SUBJECTIVE:  Patient seen at nursing request.  Patient is a functional decline. Oral intake has been poor patient has had numerous falls in the past increasing weakness overall. Increased confusional episodes at times. Notes acute pain crisis no bleeding diathesis      ROS: Limited by cognition  The rest of the 14 point ROS negative    PHYSICAL EXAM: VSS per facility record  Ill-appearing pupils are small oral mucosa was moist chest showed diminished breath sounds cardiovascular showed a regular rate abdomen soft nontender extremity trace radial pulse skin showed notes rash    ASSESSMENT & PLAN:   Diagnosis Orders   1. Alzheimer's dementia without behavioral disturbance, unspecified timing of dementia onset ([facility]     2. Adult failure to thrive         Referral for hospice services given ongoing functional decline. Supportive care. Patient reviewed        Past Medical History:   Diagnosis Date    Breast cancer ([facility]     1995 and 2008    Osteoporosis     Thyroid disease          Past Surgical History:   Procedure Laterality Date    BREAST LUMPECTOMY Left 04/28/2009    CATARACT REMOVAL Bilateral     CHOLECYSTECTOMY      HYSTERECTOMY      MASTECTOMY, MODIFIED RADICAL Left 1995         Current Outpatient Medications on File Prior to Visit   Medication Sig Dispense Refill    levothyroxine (SYNTHROID) 75 MCG tablet Take 75 mcg by mouth Daily       No current facility-administered medications on file prior to visit.          Family History   Problem Relation Age of Onset    Heart Disease Mother        Social History     Socioeconomic History    Marital status:      Spouse name: Not on file    Number of children: Not on file    Years of education: Not on file    Highest education level: Not on file   Occupational History    Not on file   Tobacco Use    Smoking status: Never Smoker    Smokeless tobacco: Never Used   Substance and Sexual Activity    Alcohol use: Yes     Comment: occasional    Drug use: Not on file    Sexual activity: Not on file   Other Topics Concern    Not on file   Social History Narrative    Not on file     Social Determinants of Health     Financial Resource Strain:     Difficulty of Paying Living Expenses: Not on file   Food Insecurity:     Worried About Running Out of Food in the Last Year: Not on file    [place] of Food in the Last Year: Not on file   Transportation Needs:     Lack of Transportation (Medical): Not on file    Lack of Transportation (Non-Medical):  Not on file   Physical Activity:     Days of Exercise per Week: Not on file    Minutes of Exercise per Session: Not on file   Stress:     Feeling of Stress : Not on file   Social Connections:     Frequency of Communication with Friends and Family: Not on file    Frequency of Social Gatherings with Friends and Family: Not on file    Attends Tenriism Services: Not on file    Active Member of [address] [place] [address] [employer] or Organizations: Not on file    Attends Club or Organization Meetings: Not on file    Marital Status: Not on file   Intimate Partner Violence:     Fear of Current or Ex-Partner: Not on file    Emotionally Abused: Not on file    Physically Abused: Not on file    Sexually Abused: Not on file   Housing Stability:     Unable to Pay for Housing in the Last Year: Not on file    Number of [place] in the Last Year: Not on file    Unstable Housing in the Last Year: Not on file         Lab Results   Component Value Date    LABA1C 5.5 05/25/2012     No results found for: EAG    Lab Results   Component Value Date     05/18/2021    K 3.8 05/18/2021     05/18/2021    CO2 24 05/18/2021    BUN 26 05/18/2021    CREATININE 1.13 05/18/2021    GLUCOSE 100 05/18/2021    GLUCOSE 88 05/07/2012    CALCIUM 9.9 05/18/2021        Lab Results   Component Value Date    CHOL 156 03/19/2019    CHOL 186 09/30/2016    CHOL 189 05/25/2012     Lab Results   Component Value Date    TRIG 72 03/19/2019    TRIG 93 09/30/2016 TRIG 115 05/25/2012     Lab Results   Component Value Date    HDL 63 (H) 03/19/2019    HDL 73 (H) 09/30/2016    HDL 53 05/25/2012     Lab Results   Component Value Date    LDLCALC 79 03/19/2019    LDLCALC 94 09/30/2016    LDLCALC 118 05/25/2012     No results found for: LABVLDL, VLDL  Lab Results   Component Value Date    CHOLHDLRATIO 3.6 05/25/2012       Lab Results   Component Value Date    TSH 2.040 12/13/2021       Lab Results   Component Value Date    WBC 6.1 05/10/2021    HGB 12.8 05/10/2021    HCT 38.4 05/10/2021    MCV 98.7 05/10/2021     05/10/2021       Please note orders entered on site at facility after discussion with appropriate facility nursing/therapy/ / nutritional staff. Current longstanding medical problems and acute medical issues addressed with staff. Available data and data elements in on site paper chart reviewed and analyzed. Current external consultant notes reviewed in on site chart. Ordered laboratory testing and imaging will be reviewed when available.

## 2022-03-15 ENCOUNTER — OFFICE VISIT (OUTPATIENT)
Dept: GERIATRIC MEDICINE | Age: 87
End: 2022-03-15
Payer: MEDICARE

## 2022-03-15 DIAGNOSIS — R21 RASH: Primary | ICD-10-CM

## 2022-03-15 DIAGNOSIS — F03.90 DEMENTIA WITHOUT BEHAVIORAL DISTURBANCE, UNSPECIFIED DEMENTIA TYPE: ICD-10-CM

## 2022-03-15 PROCEDURE — G8421 BMI NOT CALCULATED: HCPCS | Performed by: INTERNAL MEDICINE

## 2022-03-15 PROCEDURE — 1123F ACP DISCUSS/DSCN MKR DOCD: CPT | Performed by: INTERNAL MEDICINE

## 2022-03-15 PROCEDURE — G8484 FLU IMMUNIZE NO ADMIN: HCPCS | Performed by: INTERNAL MEDICINE

## 2022-03-15 PROCEDURE — 1090F PRES/ABSN URINE INCON ASSESS: CPT | Performed by: INTERNAL MEDICINE

## 2022-03-18 NOTE — PROGRESS NOTES
PATIENT: Toan Alcocer : 1929 DOS:   A [facility] serving [facility]  Seen for followup visit for adult failure to thrive, right groin rash. Patient is currently on the  hospice service under [facility]. Patient has a global functional decline. Nursing staff are  concerned about a rash with possible drainage in the right groin. Patient herself is poorly aware. Pain-free at this time. Patient was seen with nursing staff present. REVIEW OF SYSTEMS: Patient cannot provide coherent narration secondary to cognition. PHYSICAL EXAMINATION: Frail in appearance. Pupils are small. Bilateral temporal  wasting. Oral mucosa moist. Chest showed no crackles, no wheezing. Cardiovascular exam  showed a regular rate. Abdomen is soft, nontender. Right groin has evidence of a needle rash  with satellite lesions. Extremities showed trace dorsal pedal pulse. Current Outpatient Medications on File Prior to Visit   Medication Sig Dispense Refill    levothyroxine (SYNTHROID) 75 MCG tablet Take 75 mcg by mouth Daily       No current facility-administered medications on file prior to visit. Past Medical History:   Diagnosis Date    Breast cancer ([facility] [address]      and     Osteoporosis     Thyroid disease          ASSESSMENT AND PLAN:  1. Rash. Would continue with course of antifungal agent at this time. Patient is currently  under hospice in terms of interventions. 2. Adult failure to thrive, ongoing slow decline. Continue supportive care. We will follow  clinically.   ______________________________  Shanika Edouard M.D.  Maribel  D: 03/15/2022 13:57:33